# Patient Record
Sex: MALE | Race: WHITE | URBAN - METROPOLITAN AREA
[De-identification: names, ages, dates, MRNs, and addresses within clinical notes are randomized per-mention and may not be internally consistent; named-entity substitution may affect disease eponyms.]

---

## 2022-12-01 ENCOUNTER — OFFICE VISIT (OUTPATIENT)
Dept: FAMILY MEDICINE CLINIC | Facility: CLINIC | Age: 32
End: 2022-12-01
Payer: COMMERCIAL

## 2022-12-01 VITALS
HEIGHT: 69 IN | DIASTOLIC BLOOD PRESSURE: 68 MMHG | HEART RATE: 68 BPM | WEIGHT: 257 LBS | BODY MASS INDEX: 38.06 KG/M2 | SYSTOLIC BLOOD PRESSURE: 104 MMHG | RESPIRATION RATE: 18 BRPM | TEMPERATURE: 99 F | OXYGEN SATURATION: 100 %

## 2022-12-01 DIAGNOSIS — Z00.00 ROUTINE GENERAL MEDICAL EXAMINATION AT A HEALTH CARE FACILITY: ICD-10-CM

## 2022-12-01 DIAGNOSIS — E66.09 CLASS 2 OBESITY DUE TO EXCESS CALORIES WITHOUT SERIOUS COMORBIDITY WITH BODY MASS INDEX (BMI) OF 37.0 TO 37.9 IN ADULT: ICD-10-CM

## 2022-12-01 DIAGNOSIS — Z11.3 SCREENING FOR STD (SEXUALLY TRANSMITTED DISEASE): ICD-10-CM

## 2022-12-01 DIAGNOSIS — Z23 NEED FOR IMMUNIZATION AGAINST INFLUENZA: ICD-10-CM

## 2022-12-01 DIAGNOSIS — Z00.00 WELLNESS EXAMINATION: Primary | ICD-10-CM

## 2022-12-01 DIAGNOSIS — S39.012A STRAIN OF LUMBAR REGION, INITIAL ENCOUNTER: ICD-10-CM

## 2022-12-01 PROCEDURE — 90471 IMMUNIZATION ADMIN: CPT | Performed by: FAMILY MEDICINE

## 2022-12-01 PROCEDURE — 99385 PREV VISIT NEW AGE 18-39: CPT | Performed by: FAMILY MEDICINE

## 2022-12-01 PROCEDURE — 3074F SYST BP LT 130 MM HG: CPT | Performed by: FAMILY MEDICINE

## 2022-12-01 PROCEDURE — 99203 OFFICE O/P NEW LOW 30 MIN: CPT | Performed by: FAMILY MEDICINE

## 2022-12-01 PROCEDURE — 90686 IIV4 VACC NO PRSV 0.5 ML IM: CPT | Performed by: FAMILY MEDICINE

## 2022-12-01 PROCEDURE — 3008F BODY MASS INDEX DOCD: CPT | Performed by: FAMILY MEDICINE

## 2022-12-01 PROCEDURE — 3078F DIAST BP <80 MM HG: CPT | Performed by: FAMILY MEDICINE

## 2022-12-01 RX ORDER — CYCLOBENZAPRINE HCL 10 MG
10 TABLET ORAL NIGHTLY PRN
Qty: 10 TABLET | Refills: 0 | Status: SHIPPED | OUTPATIENT
Start: 2022-12-01 | End: 2022-12-21

## 2022-12-01 RX ORDER — MELOXICAM 7.5 MG/1
7.5 TABLET ORAL DAILY
Qty: 30 TABLET | Refills: 0 | Status: SHIPPED | OUTPATIENT
Start: 2022-12-01

## 2022-12-01 NOTE — PATIENT INSTRUCTIONS
Go for your fasting blood tests. Do not eat or drink except for water for at least 8 hours prior to the blood tests. Schedule your appointment with the weight loss clinic. Recommendations for exercise are 3-5 times weekly for 30-60 minutes for a minimum of 150-300 minutes. For premenopausal women and men, 1000 mg of calcium daily is recommended. For postmenopausal women, 1200 mg of calcium daily is recommended. To help the body absorb and use calcium, vitamin D 2000 international units daily is recommended. You received the Flu vaccine today. You may run a low grade fever, have mild redness or swelling at the site of the shot, muscle pain at the site of the shot for the next 2-3 days. You may take Tylenol or Ibuprofen as needed. Use your arm to help decrease pain and swelling. You can apply ice to any swelling for 10-15 minutes twice daily through clothing or a towel. Consider getting the new COVID booster. I will contact you with your test results once available.

## 2023-01-17 ENCOUNTER — OFFICE VISIT (OUTPATIENT)
Dept: FAMILY MEDICINE CLINIC | Facility: CLINIC | Age: 33
End: 2023-01-17
Payer: COMMERCIAL

## 2023-01-17 VITALS
DIASTOLIC BLOOD PRESSURE: 68 MMHG | BODY MASS INDEX: 38.06 KG/M2 | HEART RATE: 75 BPM | HEIGHT: 69 IN | RESPIRATION RATE: 18 BRPM | SYSTOLIC BLOOD PRESSURE: 100 MMHG | OXYGEN SATURATION: 99 % | WEIGHT: 257 LBS | TEMPERATURE: 98 F

## 2023-01-17 DIAGNOSIS — H92.01 ACUTE OTALGIA, RIGHT: Primary | ICD-10-CM

## 2023-01-17 DIAGNOSIS — H69.81 ACUTE DYSFUNCTION OF RIGHT EUSTACHIAN TUBE: ICD-10-CM

## 2023-01-17 PROCEDURE — 3078F DIAST BP <80 MM HG: CPT | Performed by: FAMILY MEDICINE

## 2023-01-17 PROCEDURE — 3074F SYST BP LT 130 MM HG: CPT | Performed by: FAMILY MEDICINE

## 2023-01-17 PROCEDURE — 99213 OFFICE O/P EST LOW 20 MIN: CPT | Performed by: FAMILY MEDICINE

## 2023-01-17 PROCEDURE — 3008F BODY MASS INDEX DOCD: CPT | Performed by: FAMILY MEDICINE

## 2023-01-17 RX ORDER — FLUTICASONE PROPIONATE 50 MCG
SPRAY, SUSPENSION (ML) NASAL
Qty: 1 EACH | Refills: 0 | Status: SHIPPED | OUTPATIENT
Start: 2023-01-17

## 2023-01-17 NOTE — PATIENT INSTRUCTIONS
Go to the lab today for your blood tests since you are fasting. Use the Flonase 2 sprays each nostril once daily after shower for at least the next 2 weeks or as long as you are congested. Use Tylenol or Ibuprofen as needed for pain. Follow up with me if you have any new or worsening symptoms.

## 2023-01-25 ENCOUNTER — LAB ENCOUNTER (OUTPATIENT)
Dept: LAB | Age: 33
End: 2023-01-25
Attending: FAMILY MEDICINE
Payer: COMMERCIAL

## 2023-01-25 DIAGNOSIS — Z00.00 ROUTINE GENERAL MEDICAL EXAMINATION AT A HEALTH CARE FACILITY: ICD-10-CM

## 2023-01-25 DIAGNOSIS — Z11.3 SCREENING FOR STD (SEXUALLY TRANSMITTED DISEASE): ICD-10-CM

## 2023-01-25 LAB
ALBUMIN SERPL-MCNC: 4 G/DL (ref 3.4–5)
ALBUMIN/GLOB SERPL: 1.1 {RATIO} (ref 1–2)
ALP LIVER SERPL-CCNC: 48 U/L
ALT SERPL-CCNC: 63 U/L
ANION GAP SERPL CALC-SCNC: 4 MMOL/L (ref 0–18)
AST SERPL-CCNC: 55 U/L (ref 15–37)
BASOPHILS # BLD AUTO: 0.04 X10(3) UL (ref 0–0.2)
BASOPHILS NFR BLD AUTO: 0.6 %
BILIRUB SERPL-MCNC: 1.6 MG/DL (ref 0.1–2)
BUN BLD-MCNC: 15 MG/DL (ref 7–18)
CALCIUM BLD-MCNC: 9.6 MG/DL (ref 8.5–10.1)
CHLORIDE SERPL-SCNC: 106 MMOL/L (ref 98–112)
CHOLEST SERPL-MCNC: 144 MG/DL (ref ?–200)
CO2 SERPL-SCNC: 29 MMOL/L (ref 21–32)
CREAT BLD-MCNC: 0.89 MG/DL
EOSINOPHIL # BLD AUTO: 0.06 X10(3) UL (ref 0–0.7)
EOSINOPHIL NFR BLD AUTO: 1 %
ERYTHROCYTE [DISTWIDTH] IN BLOOD BY AUTOMATED COUNT: 12.4 %
FASTING PATIENT LIPID ANSWER: YES
FASTING STATUS PATIENT QL REPORTED: YES
GFR SERPLBLD BASED ON 1.73 SQ M-ARVRAT: 118 ML/MIN/1.73M2 (ref 60–?)
GLOBULIN PLAS-MCNC: 3.7 G/DL (ref 2.8–4.4)
GLUCOSE BLD-MCNC: 97 MG/DL (ref 70–99)
HAV IGM SER QL: NONREACTIVE
HBV CORE IGM SER QL: NONREACTIVE
HBV SURFACE AG SERPL QL IA: NONREACTIVE
HCT VFR BLD AUTO: 46.9 %
HCV AB SERPL QL IA: NONREACTIVE
HDLC SERPL-MCNC: 38 MG/DL (ref 40–59)
HGB BLD-MCNC: 16.2 G/DL
IMM GRANULOCYTES # BLD AUTO: 0.06 X10(3) UL (ref 0–1)
IMM GRANULOCYTES NFR BLD: 1 %
LDLC SERPL CALC-MCNC: 72 MG/DL (ref ?–100)
LYMPHOCYTES # BLD AUTO: 2.42 X10(3) UL (ref 1–4)
LYMPHOCYTES NFR BLD AUTO: 39.1 %
MCH RBC QN AUTO: 33.2 PG (ref 26–34)
MCHC RBC AUTO-ENTMCNC: 34.5 G/DL (ref 31–37)
MCV RBC AUTO: 96.1 FL
MONOCYTES # BLD AUTO: 0.51 X10(3) UL (ref 0.1–1)
MONOCYTES NFR BLD AUTO: 8.2 %
NEUTROPHILS # BLD AUTO: 3.1 X10 (3) UL (ref 1.5–7.7)
NEUTROPHILS # BLD AUTO: 3.1 X10(3) UL (ref 1.5–7.7)
NEUTROPHILS NFR BLD AUTO: 50.1 %
NONHDLC SERPL-MCNC: 106 MG/DL (ref ?–130)
OSMOLALITY SERPL CALC.SUM OF ELEC: 289 MOSM/KG (ref 275–295)
PLATELET # BLD AUTO: 230 10(3)UL (ref 150–450)
POTASSIUM SERPL-SCNC: 4.4 MMOL/L (ref 3.5–5.1)
PROT SERPL-MCNC: 7.7 G/DL (ref 6.4–8.2)
RBC # BLD AUTO: 4.88 X10(6)UL
SODIUM SERPL-SCNC: 139 MMOL/L (ref 136–145)
T PALLIDUM AB SER QL IA: NONREACTIVE
TRIGL SERPL-MCNC: 202 MG/DL (ref 30–149)
TSI SER-ACNC: 1.56 MIU/ML (ref 0.36–3.74)
VLDLC SERPL CALC-MCNC: 31 MG/DL (ref 0–30)
WBC # BLD AUTO: 6.2 X10(3) UL (ref 4–11)

## 2023-01-25 PROCEDURE — 87591 N.GONORRHOEAE DNA AMP PROB: CPT

## 2023-01-25 PROCEDURE — 80053 COMPREHEN METABOLIC PANEL: CPT

## 2023-01-25 PROCEDURE — 36415 COLL VENOUS BLD VENIPUNCTURE: CPT

## 2023-01-25 PROCEDURE — 85025 COMPLETE CBC W/AUTO DIFF WBC: CPT

## 2023-01-25 PROCEDURE — 87491 CHLMYD TRACH DNA AMP PROBE: CPT

## 2023-01-25 PROCEDURE — 86780 TREPONEMA PALLIDUM: CPT

## 2023-01-25 PROCEDURE — 84443 ASSAY THYROID STIM HORMONE: CPT

## 2023-01-25 PROCEDURE — 80061 LIPID PANEL: CPT

## 2023-01-25 PROCEDURE — 80074 ACUTE HEPATITIS PANEL: CPT

## 2023-01-25 PROCEDURE — 87389 HIV-1 AG W/HIV-1&-2 AB AG IA: CPT

## 2023-01-26 LAB
C TRACH DNA SPEC QL NAA+PROBE: NEGATIVE
N GONORRHOEA DNA SPEC QL NAA+PROBE: NEGATIVE

## 2023-01-27 DIAGNOSIS — R79.89 ELEVATED LFTS: Primary | ICD-10-CM

## 2023-01-27 PROBLEM — E78.1 HYPERTRIGLYCERIDEMIA: Status: ACTIVE | Noted: 2023-01-27

## 2023-02-22 DIAGNOSIS — H69.81 ACUTE DYSFUNCTION OF RIGHT EUSTACHIAN TUBE: ICD-10-CM

## 2023-02-22 RX ORDER — FLUTICASONE PROPIONATE 50 MCG
SPRAY, SUSPENSION (ML) NASAL
Qty: 16 ML | Refills: 0 | Status: SHIPPED | OUTPATIENT
Start: 2023-02-22

## 2024-03-04 ENCOUNTER — OFFICE VISIT (OUTPATIENT)
Dept: FAMILY MEDICINE CLINIC | Facility: CLINIC | Age: 34
End: 2024-03-04
Payer: COMMERCIAL

## 2024-03-04 VITALS
WEIGHT: 268 LBS | BODY MASS INDEX: 40.62 KG/M2 | TEMPERATURE: 98 F | HEIGHT: 68 IN | OXYGEN SATURATION: 96 % | HEART RATE: 76 BPM | SYSTOLIC BLOOD PRESSURE: 104 MMHG | DIASTOLIC BLOOD PRESSURE: 62 MMHG | RESPIRATION RATE: 18 BRPM

## 2024-03-04 DIAGNOSIS — Z23 NEED FOR TDAP VACCINATION: ICD-10-CM

## 2024-03-04 DIAGNOSIS — E78.1 HYPERTRIGLYCERIDEMIA: ICD-10-CM

## 2024-03-04 DIAGNOSIS — R79.89 ELEVATED LFTS: ICD-10-CM

## 2024-03-04 DIAGNOSIS — Z00.00 WELLNESS EXAMINATION: Primary | ICD-10-CM

## 2024-03-04 DIAGNOSIS — Z28.21 IMMUNIZATION DECLINED: ICD-10-CM

## 2024-03-04 DIAGNOSIS — Z00.00 LABORATORY EXAM ORDERED AS PART OF ROUTINE GENERAL MEDICAL EXAMINATION: ICD-10-CM

## 2024-03-04 DIAGNOSIS — Z13.1 SCREENING FOR DIABETES MELLITUS: ICD-10-CM

## 2024-03-04 DIAGNOSIS — E66.01 MORBID OBESITY WITH BODY MASS INDEX (BMI) OF 40.0 TO 44.9 IN ADULT (HCC): ICD-10-CM

## 2024-03-04 LAB
ALBUMIN SERPL-MCNC: 3.7 G/DL (ref 3.4–5)
ALBUMIN/GLOB SERPL: 0.9 {RATIO} (ref 1–2)
ALP LIVER SERPL-CCNC: 55 U/L
ALT SERPL-CCNC: 50 U/L
ANION GAP SERPL CALC-SCNC: 1 MMOL/L (ref 0–18)
AST SERPL-CCNC: 21 U/L (ref 15–37)
BASOPHILS # BLD AUTO: 0.05 X10(3) UL (ref 0–0.2)
BASOPHILS NFR BLD AUTO: 0.8 %
BILIRUB SERPL-MCNC: 1 MG/DL (ref 0.1–2)
BUN BLD-MCNC: 10 MG/DL (ref 9–23)
CALCIUM BLD-MCNC: 9.3 MG/DL (ref 8.5–10.1)
CHLORIDE SERPL-SCNC: 111 MMOL/L (ref 98–112)
CHOLEST SERPL-MCNC: 157 MG/DL (ref ?–200)
CO2 SERPL-SCNC: 27 MMOL/L (ref 21–32)
CREAT BLD-MCNC: 0.83 MG/DL
EGFRCR SERPLBLD CKD-EPI 2021: 120 ML/MIN/1.73M2 (ref 60–?)
EOSINOPHIL # BLD AUTO: 0.05 X10(3) UL (ref 0–0.7)
EOSINOPHIL NFR BLD AUTO: 0.8 %
ERYTHROCYTE [DISTWIDTH] IN BLOOD BY AUTOMATED COUNT: 12 %
EST. AVERAGE GLUCOSE BLD GHB EST-MCNC: 117 MG/DL (ref 68–126)
FASTING PATIENT LIPID ANSWER: YES
FASTING STATUS PATIENT QL REPORTED: YES
GLOBULIN PLAS-MCNC: 4 G/DL (ref 2.8–4.4)
GLUCOSE BLD-MCNC: 99 MG/DL (ref 70–99)
HBA1C MFR BLD: 5.7 % (ref ?–5.7)
HCT VFR BLD AUTO: 45.8 %
HDLC SERPL-MCNC: 38 MG/DL (ref 40–59)
HGB BLD-MCNC: 16.1 G/DL
IMM GRANULOCYTES # BLD AUTO: 0.06 X10(3) UL (ref 0–1)
IMM GRANULOCYTES NFR BLD: 1 %
LDLC SERPL CALC-MCNC: 94 MG/DL (ref ?–100)
LYMPHOCYTES # BLD AUTO: 2.3 X10(3) UL (ref 1–4)
LYMPHOCYTES NFR BLD AUTO: 38.2 %
MCH RBC QN AUTO: 32.7 PG (ref 26–34)
MCHC RBC AUTO-ENTMCNC: 35.2 G/DL (ref 31–37)
MCV RBC AUTO: 92.9 FL
MONOCYTES # BLD AUTO: 0.44 X10(3) UL (ref 0.1–1)
MONOCYTES NFR BLD AUTO: 7.3 %
NEUTROPHILS # BLD AUTO: 3.12 X10 (3) UL (ref 1.5–7.7)
NEUTROPHILS # BLD AUTO: 3.12 X10(3) UL (ref 1.5–7.7)
NEUTROPHILS NFR BLD AUTO: 51.9 %
NONHDLC SERPL-MCNC: 119 MG/DL (ref ?–130)
OSMOLALITY SERPL CALC.SUM OF ELEC: 287 MOSM/KG (ref 275–295)
PLATELET # BLD AUTO: 210 10(3)UL (ref 150–450)
POTASSIUM SERPL-SCNC: 4.3 MMOL/L (ref 3.5–5.1)
PROT SERPL-MCNC: 7.7 G/DL (ref 6.4–8.2)
RBC # BLD AUTO: 4.93 X10(6)UL
SODIUM SERPL-SCNC: 139 MMOL/L (ref 136–145)
TRIGL SERPL-MCNC: 140 MG/DL (ref 30–149)
TSI SER-ACNC: 1.77 MIU/ML (ref 0.36–3.74)
VLDLC SERPL CALC-MCNC: 23 MG/DL (ref 0–30)
WBC # BLD AUTO: 6 X10(3) UL (ref 4–11)

## 2024-03-04 PROCEDURE — 80053 COMPREHEN METABOLIC PANEL: CPT | Performed by: FAMILY MEDICINE

## 2024-03-04 PROCEDURE — 85025 COMPLETE CBC W/AUTO DIFF WBC: CPT | Performed by: FAMILY MEDICINE

## 2024-03-04 PROCEDURE — 83036 HEMOGLOBIN GLYCOSYLATED A1C: CPT | Performed by: FAMILY MEDICINE

## 2024-03-04 PROCEDURE — 80061 LIPID PANEL: CPT | Performed by: FAMILY MEDICINE

## 2024-03-04 PROCEDURE — 84443 ASSAY THYROID STIM HORMONE: CPT | Performed by: FAMILY MEDICINE

## 2024-03-04 NOTE — PATIENT INSTRUCTIONS
Recommendations for exercise are 3-5 times weekly for 30-60 minutes for a minimum of 150-300 minutes.     For premenopausal women and men, 1000 mg of calcium daily is recommended. For postmenopausal women, 1200 mg of calcium daily is recommended.    To help the body absorb and use calcium, vitamin D 2000 international units daily is recommended.    Treatment for mild to moderate abnormal cholesterol and lipids is best managed initially with lifestyle changes, improving diet and increasing physical activity. Recommendations for exercise are 3-5 times weekly for 30-60 minutes for a minimum of 150-300 minutes. This will improve your cholesterol levels and strengthen your heart. If you are overweight, then losing weight may also improve your lipid profile.    The Mediterranean diet is recommended and contains foods high in omega-3's and alpha linoleic acid; this helps improve your good cholesterol and may lower bad cholesterol. Eat salmon 2x weekly, consume moderate amounts of vegetables, lean meats/fish, nuts and healthy fats like almonds, walnuts, flaxseed, hempseed, avocados, olive oil, spinach, green beans, organic strawberries, etc. Please avoid fried food or limit to 1x monthly along with pizza and other foods that are high in saturated animal fats.    Monitoring your cholesterol and lipid profile is recommended annually or sooner as directed by your doctor.     You received the Tdap (tetanus, diptheria, pertussis-whooping cough) vaccine today. You may run a low grade fever, have mild redness or swelling at the site of the shot, muscle pain at the site of the shot for the next 2-3 days. You may take Tylenol or Ibuprofen as needed. Use your arm to help decrease pain and swelling. You can apply ice to any swelling for 10-15 minutes twice daily through clothing or a towel.    I will contact you with your test results once available.

## 2024-03-04 NOTE — PROGRESS NOTES
The 21st Century Cures Act makes medical notes like these available to patients in the interest of transparency. Please be advised this is a medical document. Medical documents are intended to carry relevant information, facts as evident, and the clinical opinion of the practitioner. The medical note is intended as peer to peer communication and may appear blunt or direct. It is written in medical language and may contain abbreviations or verbiage that are unfamiliar.     Wyatt Tracey is a 33 year old male who is here for   Chief Complaint   Patient presents with    Well Adult       HPI:     This 33-year-old male presents to the office for his annual wellness exam and for follow-up on his laboratory done in January 2023.    The patient was noted to have elevation of his liver function tests as well as his triglycerides.  He had normal hepatitis panel.  The patient denies drinking any alcohol.  He is not currently on any medications either over-the-counter or prescription. He has no history of hepatitis or jaundice.  He denies any abdominal pain, nausea, vomiting, diarrhea, melena or hematochezia.    The patient states he is feeling well today.  He denies any associated fever, chills, cough, sore throat, runny nose, ear pain, chest pain, palpitations, shortness of breath, dizziness, syncope, leg swelling, history for anemia or thyroid problems.    The patient declines his flu shot today.  He agrees to get his Tdap booster.  He has not had the most recent COVID booster and declines it.      Exercise: none.  Diet: doesn't watch  Sleep: 5-8 hours     Depression/Anxiety:  PHQ-2 SCORE: 0  , done 3/4/2024   Last Plain Suicide Screening on 3/4/2024 was No Risk.       Fall Risk: No falls last 3 months  Gun in home:No          Glasses/contacts:No             Recent eye doctor visit:No   Hearing aids:No    Family History for:  Testicular CA - No   Prostate CA - No                               Colon CA -  No    Colonoscopy: Never    Past Medical History:   Diagnosis Date    Hypertriglyceridemia 01/27/2023    Obesity     Sleep apnea        History reviewed. No pertinent surgical history.    Family History   Problem Relation Age of Onset    No Known Problems Mother     Other (Other) Father         trigeminal neuralgia       Social History     Socioeconomic History    Marital status: Single   Tobacco Use    Smoking status: Never    Smokeless tobacco: Never   Vaping Use    Vaping Use: Never used   Substance and Sexual Activity    Alcohol use: Not Currently    Drug use: Not Currently    Sexual activity: Yes   Other Topics Concern    Caffeine Concern No    Exercise No    Seat Belt No    Special Diet No    Stress Concern Yes    Weight Concern Yes     Works as a QA assistant.    No current outpatient medications on file prior to visit.     No current facility-administered medications on file prior to visit.       No Known Allergies    REVIEW OF SYSTEMS:     See HPI for relevant ROS  GENERAL HEALTH: no other complaints  NEURO: no other complaints  VISION: no other complaints  RESPIRATORY: no other complaints  CARDIOVASCULAR: no other complaints  GI: no other complaints  : no other complaints  SKIN: no other complaints  PSYCH: no other complaints  EXT: no other complaints    EXAM:   /62 (BP Location: Left arm, Patient Position: Sitting, Cuff Size: large)   Pulse 76   Temp 98.1 °F (36.7 °C)   Resp 18   Ht 5' 8\" (1.727 m)   Wt 268 lb (121.6 kg)   SpO2 96%   BMI 40.75 kg/m²     Wt Readings from Last 3 Encounters:   03/04/24 268 lb (121.6 kg)   01/17/23 257 lb (116.6 kg)   12/01/22 257 lb (116.6 kg)     BP Readings from Last 3 Encounters:   03/04/24 104/62   01/17/23 100/68   12/01/22 104/68        Visual Acuity  Right Eye Visual Acuity: Uncorrected Right Eye Chart Acuity: 20/20   Left Eye Visual Acuity: Uncorrected Left Eye Chart Acuity: 20/20   Both Eyes Visual Acuity: Uncorrected Both Eyes Chart Acuity: 20/20    Able To Tolerate Visual Acuity: Yes      Weight is up 11 # from 01/17/2023 OV    General: WH/morbidly obese/WD, in NAD, A and O times 3  HEAD: Normocephalic, atraumatic  EYES: RUMA, EOMI, conjunctiva normal, sclera anicteric  EARS: Tympanic membranes normal, EAC's normal.  NOSE: Turbninates normal, no bleeding noted.  PHARYNX:  No eythema or exudates, mucous membrane moist, airway patent.  NECK:  No cervical lymphadenopathy or thyromegaly, normal ROM, no bruits noted.  HEART: Regular rate and rhythm, no S3, S4 or murmur noted.  LUNGS: Clear to ausculation. No retractions or tachypnea noted.  ABDOMEN: Soft, obese,nontender, no guarding, rigidity or rebound, no masses or hepatosplenomegaly, normal bowel sounds in all four quadrants.  : deferred  BACK: No tenderness over the thoracic or lumbar spine. No scoliosis noted.  EXTREMITIES: No clubbing, cyanosis, edema noted. Motor strength +5/5 in all 4 extremities. DTR's +2/4 in all 4 extremities.  SKIN: Warm and dry.  NEURO: Cr. N. II-XII intact, normal gait  PSYCH: Mood and affect are normal.    ASSESSMENT AND PLAN:   1. Wellness examination  -We discussed the following:  Healthy diet and exercise, immunizations, cancer screening.    2. Laboratory exam ordered as part of routine general medical examination    - CBC With Differential With Platelet; Future  - Lipid Panel; Future  - TSH W Reflex To Free T4; Future    3. Screening for diabetes mellitus    - Hemoglobin A1C [E]; Future    4. Elevated LFTs    Previous AST and ALT were 55 and 63. Will recheck CMP. May be related to fatty liver.    - Comp Metabolic Panel (14) [E]; Future    5. Hypertriglyceridemia    Cholesterol:     Lab Results   Component Value Date    CHOLEST 144 01/25/2023     Lab Results   Component Value Date    HDL 38 (L) 01/25/2023     Lab Results   Component Value Date    TRIG 202 (H) 01/25/2023     Lab Results   Component Value Date    LDL 72 01/25/2023     Lab Results   Component Value Date    AST  55 (H) 01/25/2023     Lab Results   Component Value Date    ALT 63 (H) 01/25/2023     Due for repeat lipid panel.    6. Morbid obesity with body mass index (BMI) of 40.0 to 44.9 in adult (HCC)    Encouraged patient to monitor his diet and increase exercise.    7. Need for Tdap vaccination    Tdap given today. Side effects reviewed.    - TdaP (Adacel, Boostrix) [35289]    8. Immunization declined    Patient declined flu shot and COVID booster.    - Influenza Refused         Health maintenance:    Health Maintenance   Topic Date Due    DTaP,Tdap,and Td Vaccines (7 - Td or Tdap) 03/04/2034    COVID-19 Vaccine (3 - 2023-24 season) 03/04/2025    Influenza Vaccine (1) 06/30/2024    Annual Physical  03/04/2025    Annual Depression Screening  Completed    Pneumococcal Vaccine: Birth to 64yrs  Aged Out         Orders This Visit:  Orders Placed This Encounter   Procedures    Comp Metabolic Panel (14) [E]    CBC With Differential With Platelet    Lipid Panel    TSH W Reflex To Free T4    Hemoglobin A1C [E]    TdaP (Adacel, Boostrix) [28796]    Influenza Refused       Meds This Visit:  Requested Prescriptions      No prescriptions requested or ordered in this encounter       Imaging & Referrals:  TETANUS, DIPHTHERIA TOXOIDS AND ACELLULAR PERTUSIS VACCINE (TDAP), >7 YEARS, IM USE  INFLUENZA REFUSED [facility]     The patient indicates understanding of these issues and agrees to the plan.  The patient is asked to return pending lab results.  Maribel Padilla,     I spent a total of 40 minutes including precharting, H&P, plan of care    This dictation was performed with a verbal recognition program (DRAGON) and it was checked for errors. It is possible that there are still dictated errors within this office note. If so, please bring any errors to my attention for an addendum. All efforts were made to ensure that this office note is accurate

## 2024-03-05 PROBLEM — R73.03 PREDIABETES: Status: ACTIVE | Noted: 2024-03-05

## 2024-07-23 ENCOUNTER — TELEPHONE (OUTPATIENT)
Dept: FAMILY MEDICINE CLINIC | Facility: CLINIC | Age: 34
End: 2024-07-23

## 2024-07-23 NOTE — TELEPHONE ENCOUNTER
Patient scheduled an appointment thru My-Chart for 8/16/24 and stated in the appointment notes High migraine, throwing up gaging reflex, sore throat. Immense stress. . Please advise if patient is OK to wait for appointment or needs to be seen sooner.     Future Appointments   Date Time Provider Department Center   8/16/2024 12:00 PM Maribel Padilla DO [facility] [place]

## 2024-07-26 NOTE — TELEPHONE ENCOUNTER
Spoke w/patient. Moved appointment up to Monday in a cancellation spot. Patient is currently doing a bit better explaining that he did try an urgent care and got some relief and the symptoms are not constant so he is managing. He will come in Monday. Patient/caregiver verbalized agreement and understanding.

## 2024-07-29 ENCOUNTER — OFFICE VISIT (OUTPATIENT)
Dept: FAMILY MEDICINE CLINIC | Facility: CLINIC | Age: 34
End: 2024-07-29
Payer: COMMERCIAL

## 2024-07-29 VITALS
HEART RATE: 88 BPM | RESPIRATION RATE: 18 BRPM | OXYGEN SATURATION: 97 % | WEIGHT: 277 LBS | TEMPERATURE: 100 F | SYSTOLIC BLOOD PRESSURE: 120 MMHG | DIASTOLIC BLOOD PRESSURE: 80 MMHG | BODY MASS INDEX: 41.98 KG/M2 | HEIGHT: 68 IN

## 2024-07-29 DIAGNOSIS — G43.009 MIGRAINE WITHOUT AURA AND WITHOUT STATUS MIGRAINOSUS, NOT INTRACTABLE: Primary | ICD-10-CM

## 2024-07-29 DIAGNOSIS — R11.0 NAUSEA: ICD-10-CM

## 2024-07-29 DIAGNOSIS — J30.2 SEASONAL ALLERGIC RHINITIS, UNSPECIFIED TRIGGER: ICD-10-CM

## 2024-07-29 DIAGNOSIS — L73.9 FOLLICULITIS: ICD-10-CM

## 2024-07-29 PROCEDURE — 3074F SYST BP LT 130 MM HG: CPT | Performed by: FAMILY MEDICINE

## 2024-07-29 PROCEDURE — 3079F DIAST BP 80-89 MM HG: CPT | Performed by: FAMILY MEDICINE

## 2024-07-29 PROCEDURE — 3008F BODY MASS INDEX DOCD: CPT | Performed by: FAMILY MEDICINE

## 2024-07-29 PROCEDURE — G2211 COMPLEX E/M VISIT ADD ON: HCPCS | Performed by: FAMILY MEDICINE

## 2024-07-29 PROCEDURE — 99214 OFFICE O/P EST MOD 30 MIN: CPT | Performed by: FAMILY MEDICINE

## 2024-07-29 RX ORDER — SULFAMETHOXAZOLE AND TRIMETHOPRIM 800; 160 MG/1; MG/1
1 TABLET ORAL 2 TIMES DAILY
Qty: 14 TABLET | Refills: 0 | Status: SHIPPED | OUTPATIENT
Start: 2024-07-29

## 2024-07-29 RX ORDER — FLUTICASONE PROPIONATE 50 MCG
SPRAY, SUSPENSION (ML) NASAL
Qty: 1 EACH | Refills: 2 | Status: SHIPPED | OUTPATIENT
Start: 2024-07-29

## 2024-07-29 RX ORDER — SUMATRIPTAN 100 MG/1
TABLET, FILM COATED ORAL
Qty: 9 TABLET | Refills: 1 | Status: SHIPPED | OUTPATIENT
Start: 2024-07-29

## 2024-07-29 RX ORDER — ONDANSETRON 4 MG/1
4 TABLET, FILM COATED ORAL EVERY 8 HOURS PRN
Qty: 20 TABLET | Refills: 2 | Status: SHIPPED | OUTPATIENT
Start: 2024-07-29

## 2024-07-29 NOTE — PATIENT INSTRUCTIONS
Take the Bactrim DS one tablet with breakfast and dinner for the folliculitis at the back of your head. Bactrim can cause sun sensitivity and easy sunburn. Use at least a 30 SPF sunscreen if you are in the sun while on this medication.     Do not shave the back of your head for the next 4 weeks to let this heal.    Continue head and shoulders shampoo at least 2 times a week.    Use the Flonase 2 sprays each nostril once daily after shower for at least the next 4 weeks or as long as you are congested.    Take Zyrtec 10 mg one tablet at bedtime for the nasal congestion.    Take the Zofran 4 mg ODT one tablet every 6 hours as needed for nausea or vomiting.    Take Excedrin Migraine at the onset of your headache. If you do not get any relief in one hour, then take the Imitrex.    Take the Imitrex at onset of migraine. May repeat one tab in 2 hours if not better. No more than 2 tabs per 24 hours. Do not use more than 2x/week    Keep a headache diary. List the level of pain 1-10, what medication you took to relieve the pain and any possible triggers.    Let me know if you headaches do not improve.

## 2024-07-29 NOTE — PROGRESS NOTES
The 21st Century Cures Act makes medical notes like these available to patients in the interest of transparency. Please be advised this is a medical document. Medical documents are intended to carry relevant information, facts as evident, and the clinical opinion of the practitioner. The medical note is intended as peer to peer communication and may appear blunt or direct. It is written in medical language and may contain abbreviations or verbiage that are unfamiliar.       Wyatt Tracey is a 34 year old male.    HPI:     Chief Complaint   Patient presents with    Migraine     C/o migraines x 1 month patient states he had 2 episodes last week lasting 4 hours        This 34-year-old male presents to the office with complaint of increased frequency of headaches over the last month.  The patient states he has had low-grade headache on a daily basis but last week, he had 2 episodes on Monday and Tuesday which were severe enough to send him home from work.  He states the headaches have been located on the top of his head and have been associated with nausea but no vomiting, blurred vision but no slurred speech, numbness or weakness in the arms or legs.  He states the last time he had headaches this severe was in 2016.  He has only taken 1 dose of ibuprofen 200 mg in the 4 weeks for his headaches.  There is no family history for migraines.  He has noted increasing sinus congestion and nasal stuffiness and tested himself for COVID last week which was negative.  He denies any known seasonal allergies.  He is not currently using any antihistamines or nasal sprays.  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  He has dramatic temperature changes several times a day.    The patient also is complaining of some itchy sore bumps in the back of his head for the last week.  He states he had his head shaved a week ago.  He does shampoo with head and  shoulders a couple times a week.  He has not put anything on the bumps.    HISTORY:  Past Medical History:    Hypertriglyceridemia    Obesity    Sleep apnea      No past surgical history on file.   Family History   Problem Relation Age of Onset    No Known Problems Mother     Other (Other) Father         trigeminal neuralgia      Social History:   Social History     Socioeconomic History    Marital status: Single   Tobacco Use    Smoking status: Never    Smokeless tobacco: Never   Vaping Use    Vaping status: Never Used   Substance and Sexual Activity    Alcohol use: Not Currently    Drug use: Not Currently    Sexual activity: Yes   Other Topics Concern    Caffeine Concern No    Exercise No    Seat Belt No    Special Diet No    Stress Concern Yes    Weight Concern Yes        Medications (Active prior to today's visit):  Current Outpatient Medications   Medication Sig Dispense Refill    sulfamethoxazole-trimethoprim -160 MG Oral Tab per tablet Take 1 tablet by mouth 2 (two) times daily. 14 tablet 0    SUMAtriptan Succinate (IMITREX) 100 MG Oral Tab Take at onset of migraine. May repeat one tab in 2 hours if not better. No more than 2 tabs per 24 hours. Do not use more than 2x/week. 9 tablet 1    ondansetron (ZOFRAN) 4 mg tablet Take 1 tablet (4 mg total) by mouth every 8 (eight) hours as needed for Nausea. 20 tablet 2    fluticasone propionate 50 MCG/ACT Nasal Suspension 2 sprays to each nostril once daily after shower. 1 each 2       Allergies:  No Known Allergies    ROS:     Pertinent positives and pertinent negatives are as listed in HPI.    All other review of symptoms were reviewed and negative.    PHYSICAL EXAM:   /80   Pulse 88   Temp 99.7 °F (37.6 °C)   Resp 18   Ht 5' 8\" (1.727 m)   Wt 277 lb (125.6 kg)   SpO2 97%   BMI 42.12 kg/m²     Wt Readings from Last 3 Encounters:   07/29/24 277 lb (125.6 kg)   03/04/24 268 lb (121.6 kg)   01/17/23 257 lb (116.6 kg)       BP Readings from Last 3  Encounters:   07/29/24 120/80   03/04/24 104/62   01/17/23 100/68       General: Morbidly obese, well hydrated. No acute distress. No pallor.   HEENT: Normocephalic, atraumatic.  RUMA, EOMI, Sclera clear and non icteric bilaterally. TM's normal, nose with marked congestion, pharynx without redness or exudates.  Postnasal drip is noted.  Moist mucous membranes.  Neck: Supple. No lymphadenopathy. No thyromegaly. No bruits noted.  Heart: RRR without S3 or S4 or murmur.  Lungs: Clear to auscultation bilaterally. No rales, rhonchi or wheezes. No tachypnea or retractions noted.  Abdomen: Soft, nontender, nondistended, normal bowel sounds. No organomegaly. No guarding, rigidity or rebound noted.  Extremities: No edema bilaterally.  Skin: Warm and dry. Folliculitis noted posterior scalp along the occiput. No purulent drainage noted.  Neuro: Alert and oriented x 3.Cr. N. II-XII intact, normal gait.  Psych: Normal mood and affect.     ASSESSMENT/PLAN:   34 year old male with      1. Migraine without aura and without status migrainosus, not intractable    I discussed migraine headaches with the patient.  We discussed possible triggers for migraines.  I am asking he keep a headache diary.  He may start with Excedrin Migraine at the onset of his headache.  If no relief in 1 hour, then he should take the Imitrex.  He was reminded not to take more than 2 in 24 hours and no more often than twice a week.  He will let me know if his headaches or not improving.    - SUMAtriptan Succinate (IMITREX) 100 MG Oral Tab; Take at onset of migraine. May repeat one tab in 2 hours if not better. No more than 2 tabs per 24 hours. Do not use more than 2x/week.  Dispense: 9 tablet; Refill: 1    2. Seasonal allergic rhinitis, unspecified trigger    I discussed how allergies can also be a trigger for migraines.  I would like him to take Zyrtec 10 mg at nighttime daily and a prescription for Flonase 2 sprays each nostril once daily after shower for  the next 4 weeks or as long as he is congested was sent to his pharmacy.    - fluticasone propionate 50 MCG/ACT Nasal Suspension; 2 sprays to each nostril once daily after shower.  Dispense: 1 each; Refill: 2    3. Folliculitis    The patient was noted to have multiple inflamed hair follicles along the occiput.  They are red and irritated.  There is no purulent drainage noted.  A prescription for Bactrim DS 1 tablet twice daily for 7 days was given.  Sun precautions were given.  The patient was advised not to get his hair shaved for at least the next 4 weeks to let this heal.    - sulfamethoxazole-trimethoprim -160 MG Oral Tab per tablet; Take 1 tablet by mouth 2 (two) times daily.  Dispense: 14 tablet; Refill: 0    4. Nausea    A prescription for Zofran 4 mg ODT 1 tablet every 6-8 hours as needed for nausea was given.    - ondansetron (ZOFRAN) 4 mg tablet; Take 1 tablet (4 mg total) by mouth every 8 (eight) hours as needed for Nausea.  Dispense: 20 tablet; Refill: 2         Health Maintenance:    Health Maintenance   Topic Date Due    COVID-19 Vaccine (3 - 2023-24 season) 03/04/2025 (Originally 9/1/2023)    Influenza Vaccine (1) 10/01/2024    Annual Physical  03/04/2025    DTaP,Tdap,and Td Vaccines (8 - Td or Tdap) 03/04/2034    Annual Depression Screening  Completed    Pneumococcal Vaccine: Birth to 64yrs  Aged Out         Meds This Visit:  Requested Prescriptions     Signed Prescriptions Disp Refills    sulfamethoxazole-trimethoprim -160 MG Oral Tab per tablet 14 tablet 0     Sig: Take 1 tablet by mouth 2 (two) times daily.    SUMAtriptan Succinate (IMITREX) 100 MG Oral Tab 9 tablet 1     Sig: Take at onset of migraine. May repeat one tab in 2 hours if not better. No more than 2 tabs per 24 hours. Do not use more than 2x/week.    ondansetron (ZOFRAN) 4 mg tablet 20 tablet 2     Sig: Take 1 tablet (4 mg total) by mouth every 8 (eight) hours as needed for Nausea.    fluticasone propionate 50 MCG/ACT  Nasal Suspension 1 each 2     Si sprays to each nostril once daily after shower.       Imaging & Referrals:  None     Patient understands plan and follow-up.  Follow up if not improvement in the headaches.    2024  Maribel Padilla DO    Total time: 30 minutes including precharting, H&P, plan of care    This dictation was performed with a verbal recognition program (DRAGON) and it was checked for errors. It is possible that there are still dictated errors within this office note. If so, please bring any errors to my attention for an addendum. All efforts were made to ensure that this office note is accurate

## 2024-10-12 ENCOUNTER — OFFICE VISIT (OUTPATIENT)
Dept: FAMILY MEDICINE CLINIC | Facility: CLINIC | Age: 34
End: 2024-10-12
Payer: COMMERCIAL

## 2024-10-12 VITALS
DIASTOLIC BLOOD PRESSURE: 70 MMHG | WEIGHT: 280 LBS | SYSTOLIC BLOOD PRESSURE: 104 MMHG | HEIGHT: 68 IN | BODY MASS INDEX: 42.44 KG/M2 | HEART RATE: 66 BPM | RESPIRATION RATE: 18 BRPM | TEMPERATURE: 98 F

## 2024-10-12 DIAGNOSIS — R68.84 JAW PAIN: Primary | ICD-10-CM

## 2024-10-12 DIAGNOSIS — M26.621 ARTHRALGIA OF RIGHT TEMPOROMANDIBULAR JOINT: ICD-10-CM

## 2024-10-12 DIAGNOSIS — Z28.21 IMMUNIZATION DECLINED: ICD-10-CM

## 2024-10-12 PROBLEM — G47.33 OSA (OBSTRUCTIVE SLEEP APNEA): Status: ACTIVE | Noted: 2024-10-12

## 2024-10-12 PROCEDURE — 3074F SYST BP LT 130 MM HG: CPT | Performed by: FAMILY MEDICINE

## 2024-10-12 PROCEDURE — G2211 COMPLEX E/M VISIT ADD ON: HCPCS | Performed by: FAMILY MEDICINE

## 2024-10-12 PROCEDURE — 99213 OFFICE O/P EST LOW 20 MIN: CPT | Performed by: FAMILY MEDICINE

## 2024-10-12 PROCEDURE — 3078F DIAST BP <80 MM HG: CPT | Performed by: FAMILY MEDICINE

## 2024-10-12 PROCEDURE — 3008F BODY MASS INDEX DOCD: CPT | Performed by: FAMILY MEDICINE

## 2024-10-12 RX ORDER — MELOXICAM 15 MG/1
15 TABLET ORAL DAILY
Qty: 15 TABLET | Refills: 0 | Status: SHIPPED | OUTPATIENT
Start: 2024-10-12

## 2024-10-12 NOTE — PROGRESS NOTES
The 21st Century Cures Act makes medical notes like these available to patients in the interest of transparency. Please be advised this is a medical document. Medical documents are intended to carry relevant information, facts as evident, and the clinical opinion of the practitioner. The medical note is intended as peer to peer communication and may appear blunt or direct. It is written in medical language and may contain abbreviations or verbiage that are unfamiliar.       Wyatt Tracey is a 34 year old male.    HPI:     Chief Complaint   Patient presents with    Jaw Pain     Right side - x7 days       This 34-year-old male presents to the office with complaint of right-sided jaw pain for the last 7 days.  The patient has pain and swelling anterior to the right ear.  He states he saw his dentist 2 days ago.  He had x-rays done which showed no evidence for infection.  He was told he had inflammation noted on the x-ray and was told to take some Tylenol.  Patient has not taken any Tylenol yet because he did not have any in the house.  He has no associated fever, chills, runny nose, sore throat, earache.  He had a little bit of a dry cough earlier in the week but it has since resolved.  He has pain with chewing.  He states he has been mostly eating soups the last 2 days.  He has no known history for grinding his teeth.  The symptoms are only on the right side.  He does not chew gum regularly.    HISTORY:  Past Medical History:    Hypertriglyceridemia    Obesity    Sleep apnea      History reviewed. No pertinent surgical history.   Family History   Problem Relation Age of Onset    No Known Problems Mother     Other (Other) Father         trigeminal neuralgia      Social History:   Social History     Socioeconomic History    Marital status: Single   Tobacco Use    Smoking status: Never    Smokeless tobacco: Never   Vaping Use    Vaping status: Never Used   Substance and Sexual Activity    Alcohol use: Not  Currently    Drug use: Not Currently    Sexual activity: Yes   Other Topics Concern    Caffeine Concern No    Exercise No    Seat Belt No    Special Diet No    Stress Concern Yes    Weight Concern Yes        Medications (Active prior to today's visit):  Current Outpatient Medications   Medication Sig Dispense Refill    Meloxicam 15 MG Oral Tab Take 1 tablet (15 mg total) by mouth daily. 15 tablet 0    SUMAtriptan Succinate (IMITREX) 100 MG Oral Tab Take at onset of migraine. May repeat one tab in 2 hours if not better. No more than 2 tabs per 24 hours. Do not use more than 2x/week. 9 tablet 1       Allergies:  Allergies[1]    ROS:     Pertinent positives and pertinent negatives are as listed in HPI.    All other review of symptoms were reviewed and negative.    PHYSICAL EXAM:   /70 (BP Location: Left arm, Patient Position: Sitting, Cuff Size: large)   Pulse 66   Temp 97.9 °F (36.6 °C)   Resp 18   Ht 5' 8\" (1.727 m)   Wt 280 lb (127 kg)   BMI 42.57 kg/m²     Wt Readings from Last 3 Encounters:   10/12/24 280 lb (127 kg)   07/29/24 277 lb (125.6 kg)   03/04/24 268 lb (121.6 kg)       BP Readings from Last 3 Encounters:   10/12/24 104/70   07/29/24 120/80   03/04/24 104/62     Weight is up 12 # from 03/04/2024 OV    General: Obese, well hydrated. No acute distress. No pallor.   HEENT: Normocephalic, atraumatic.  RUMA, EOMI, Sclera clear and non icteric bilaterally. TM's normal, nose without congestion, pharynx without redness or exudates. Moist mucous membranes.  He has tenderness noted over the right TMJ.  No palpable click is noted.  He has pain with motion of the jaw. No swelling of the parotid gland is noted.  Neck: Supple. No lymphadenopathy. No thyromegaly. No bruits noted.  Heart: RRR without S3 or S4 or murmur.  Lungs: Clear to auscultation bilaterally. No rales, rhonchi or wheezes. No tachypnea or retractions noted.  Extremities: No edema bilaterally.  Skin: Warm and dry.  Neuro: Alert and  oriented x 3,  normal gait.  Psych: Normal mood and affect.     ASSESSMENT/PLAN:   34 year old male with         1. Jaw pain  2. Arthralgia of right temporomandibular joint    I discussed TMJ syndrome with the patient.  Symptomatic treatment with ice is reviewed.  He should avoid chewing gum or trying to bite on anything hard or crunchy.  He should stay on a softer diet until the pain improves.  A prescription for meloxicam 15 mg 1 tablet daily with food was given.  Usage and side effects are reviewed.  The patient was advised if his symptoms are not improving over the next 2 weeks, he should speak with his dentist about whether or not he would benefit from a bite guard.    - Meloxicam 15 MG Oral Tab; Take 1 tablet (15 mg total) by mouth daily.  Dispense: 15 tablet; Refill: 0    3. Immunization declined    Patient declined flu shot today.  I encouraged him to get his flu shot and COVID booster at his local pharmacy.    - INFLUENZA REFUSED [facility]       Health Maintenance:    Health Maintenance   Topic Date Due    COVID-19 Vaccine (3 - 2023-24 season) 10/01/2025    Influenza Vaccine (1) 10/01/2025    Annual Physical  03/04/2025    DTaP,Tdap,and Td Vaccines (8 - Td or Tdap) 03/04/2034    Annual Depression Screening  Completed    Pneumococcal Vaccine: Birth to 64yrs  Aged Out         Meds This Visit:  Requested Prescriptions     Signed Prescriptions Disp Refills    Meloxicam 15 MG Oral Tab 15 tablet 0     Sig: Take 1 tablet (15 mg total) by mouth daily.       Imaging & Referrals:  INFLUENZA REFUSED [facility]     Patient understands plan and follow-up.  Follow up with his dentist if not improving.    10/12/2024  Maribel Padilla DO    Total time: 20 minutes including precharting, H&P, plan of care    This dictation was performed with a verbal recognition program (DRAGON) and it was checked for errors. It is possible that there are still dictated errors within this office note. If so, please bring any errors to my attention for  an addendum. All efforts were made to ensure that this office note is accurate           [1] No Known Allergies

## 2024-10-12 NOTE — PATIENT INSTRUCTIONS
Take the Meloxicam 15 mg one tablet daily with breakfast for the jaw pain.    Do not take any Ibuprofen, Advil, Motrin Aleve, Naproxen while on this medication.  Take your anti inflammatory medicine with food, stop and call if gastrointestinal (GI) side effects.  You are at risk of GI upset, stomach ulcer, gastrointestional bleeding while taking anti-inflammatory medications.   If you are having stomach pain or worsening heartburn, stop the medication and call a physician immediately.   If black stool or rectal bleeding, go to the nearest emergency room as-soon-as possible.  Tylenol is safe to take later in the day if needed.    Avoid biting on any thing hard or using chewing gum.     Always ice through a towel or clothing, never directly on bare skin to avoid frost bite. Do not ice longer than 10-15 minutes at a time but you can do it multiple times a day.    If not better in 2 weeks, as your dentist about a bite guard.    Consider getting your flu shot and the new COVID booster at your local pharmacy.    Keep your appointment as scheduled on 10/4/2024.

## 2024-11-04 ENCOUNTER — OFFICE VISIT (OUTPATIENT)
Dept: FAMILY MEDICINE CLINIC | Facility: CLINIC | Age: 34
End: 2024-11-04
Payer: COMMERCIAL

## 2024-11-04 VITALS
RESPIRATION RATE: 18 BRPM | HEART RATE: 70 BPM | TEMPERATURE: 98 F | OXYGEN SATURATION: 99 % | BODY MASS INDEX: 42.28 KG/M2 | HEIGHT: 68 IN | SYSTOLIC BLOOD PRESSURE: 98 MMHG | DIASTOLIC BLOOD PRESSURE: 58 MMHG | WEIGHT: 279 LBS

## 2024-11-04 DIAGNOSIS — E66.01 MORBID OBESITY WITH BODY MASS INDEX (BMI) OF 40.0 TO 44.9 IN ADULT (HCC): ICD-10-CM

## 2024-11-04 DIAGNOSIS — G47.33 OSA (OBSTRUCTIVE SLEEP APNEA): ICD-10-CM

## 2024-11-04 DIAGNOSIS — L73.9 FOLLICULITIS: Primary | ICD-10-CM

## 2024-11-04 PROCEDURE — 3074F SYST BP LT 130 MM HG: CPT | Performed by: FAMILY MEDICINE

## 2024-11-04 PROCEDURE — 99214 OFFICE O/P EST MOD 30 MIN: CPT | Performed by: FAMILY MEDICINE

## 2024-11-04 PROCEDURE — 3008F BODY MASS INDEX DOCD: CPT | Performed by: FAMILY MEDICINE

## 2024-11-04 PROCEDURE — 3078F DIAST BP <80 MM HG: CPT | Performed by: FAMILY MEDICINE

## 2024-11-04 PROCEDURE — G2211 COMPLEX E/M VISIT ADD ON: HCPCS | Performed by: FAMILY MEDICINE

## 2024-11-04 RX ORDER — SULFAMETHOXAZOLE AND TRIMETHOPRIM 800; 160 MG/1; MG/1
1 TABLET ORAL 2 TIMES DAILY
Qty: 14 TABLET | Refills: 0 | Status: SHIPPED | OUTPATIENT
Start: 2024-11-04

## 2024-11-04 RX ORDER — ECHINACEA PURPUREA EXTRACT 125 MG
1 TABLET ORAL AS NEEDED
COMMUNITY
Start: 2023-12-04 | End: 2024-12-03

## 2024-11-04 NOTE — PATIENT INSTRUCTIONS
Take the Bactrim DS one tablet with breakfast and dinner for one week. Bactrim can cause sun sensitivity and easy sunburn. Use at least a 30 SPF sunscreen if you are in the sun while on this medication.     Schedule your sleep study to assess for sleep apnea and possible need for CPAP machine. After the test has been completed, you will see Dr. Denise or one of her partners, the sleep specialist.    Make an appointment with the weight loss clinic. Check with your insurance company to see what medications they cover for weight loss.    Patient Resources:     Personal Training/Fitness Classes/Health Coaching     [facility] in [place]: Full fitness center with group fitness and personal training located in [place].  Health Coaching with Miracle Shepard, Kenyon Lo, and Edgar Trammell at our [facility]- individual coaching to work on your health goals. Call [phone] and/or email @ [email or web address]. Free 60 minute consult when client of [employer] [facility].  [address] [place] [address] http://www.[employer]. A variety of group fitness options plus various yoga classes [phone] and/or email Karlene at [email or web address]  [facility] with multiple locations: KnCMiner Fitness (www.Soysuper), F45 Training (www.b23mnzlwpkw.Wave [employer]band), Fit Body Bootcamp (www.AVOS SystemsbodybootWideOrbitp.Wave Broadband), InvestingNote (www.[employer].Wave Broadband), The Exercise  (www.exercisecoach.com), Club Pilates (www.clubpilates.com)     Online Fitness  Fitness  on Band Digital  Fit in 10 DVD series                              www.orpef48SWO.Wave Broadband  Chair exercises via Sit and Be Fit (www.sitandbefit.org) and Intelen (www.ticketstreet.com) or Jeramy Hadley or Rio Perez videos on YouTube.  Hip Hop Fit with Ruddy Delgadillo at www.hiphopfit.net     Apps for on the Go Fitness  [place] 7 Minute Workout (orange box with white 7) - free on the go HIIT training  marsha  Peloton Marsha @ www.onepeloton.com     Nutrition Trackers and Programs  LoseIT! And My Fitness Pal apps and on line for tracking nutrition  NOOM - virtual health coaching  FitFoundation (healthy meals on the go) in [place] @ www.eglcxqsvkcvil1kMarkerly  Julieta SAEED @ www.bistromd.[employer]. and Maxumu69 (calorie smart and low carb plans recommended) @ www.iroqyz07.com, Metabolic Meals @ www.MyMetabolicMeals.com - individual prepared meals to go  Gobble, Blue Apron, Home , Every Plate, Sunbasket- on line meal delivery programs for preparation at home  Meal Village in [place] for homemade meals to go @ www.mealGrand St.llage.[employer].  Diet Doctor @ www.dietdoctor.com - low carb swaps  Yummly - meal prep and planning marsha (Fourandhalf.yummly.com)     Stress, Anxiety, Depression, Trauma  CALM meditation marsha (www.calm.com)  Headspace  Don't let anxiety run your life. Using the science of emotion regulation and mindfulness to overcome fear and worry by Shaun Rodriguez PsyD and Brett Baker MA.  [employer] Podcast (September 27, 2023): 6 Magic Words That Stop Anxiety  What Happened to You?- a look at the impact trauma has on behavior written by Sary Emery and Dr. Prashanth Trevino  Whole Again by Arnaldo Gamino - discovering your true self after trauma     Mindful Eating/The Hungry Brain  Am I Hungry? Mindful eating virtual  marsha (www.amihungry.com)  The Hungry Brain by La Melara, PhD  Mindless Eating by Mark Kam  Weight Loss Surgery Will Not Treat Food Addiction by Preethi Hirsch Ph.D     Metabolic Dysfunction, Hormones and Cravings  Why We Get Sick? By Gary Mckenzie (insulin resistance)  Your Body in Balance: The New Science of Food, Hormones, and Health by Dr. Shane Painting  The Complete Guide to fasting by Dr. Munoz  Fast Like a Girl by Dr. Kell Browne  The Menopause Reset by Dr. Kell Browne  Sugar, Salt & Fat by Keyanna Solitario, Ph.D, R.D.  The Truth About Sugar - documentary on sugar (Free on Utube,  https://youtu.be/4N1rnvrGW1y)  Reverse Visceral Fat: #1 Way to Increase Your Lifespan & End Inflammation with Dr. Harrison Chaney on Utube @ https://youtu.be/nupPRnvUpJY?si=ri2kidTkNSP8PsuR     Nutrition Support  You Are What You Eat - Netfix series on twin study looking at impact of nutrition changes on health  The End of Dieting: How to Live for Life by Dr. Osbaldo Landa M.D. or listen to The [employer] Podcast Episode 63: Understanding \"Nutritarian\" Eating w/Dr. Osbaldo Landa  The Game Changers- Netflix Documentary on plant based nutrition  The Dr. Vee T5 Wellness Plan by Dr. Reji Vee MD  The Complete Guide to fasting by Dr. Munoz  @[employer] (Instagram Dietician with support surrounding nutrition and meal prep/planning)     Education, Motivation and Support Resources  Live to 100: Secrets of the Blue Zones - Netflix series on the secrets to communities living over 100 years old  Atomic Habits by Ryan Chris (a book about taking small steps to promote greater behavior change)   Motivation shadi (black box with white \")- daily supportive messages sent to your phone  Can't Hurt Me by Shaun Dominguez (a book exploring the power of discipline in achieving your goals)  Fed Up - documentary about obesity (Free on Utube)  Www.yourweightmatters.org - Obesity Action Coalition sponsored Blog posts  Obesity Action Coalition Resources on topics specific to weight management (www.obesityaction.org)  Fitlosophy Fitspiration - journal to better health (journal book found at [employer] in fitness aisle)  Bryant Marin talk titled: The Call to Courage (Netflix)  The Exam Room by the Physician's Committee (Podcast)  Nutrition Facts by Dr. Delgadillo (Podcast)     Balanced Nutrition includes:      Build the mentality of Food 4 Fuel. Clean eating with whole foods and eliminating/reducing ultra processed foods.  Be an intuitive eater and using mindful eating practices.  Eat a balanced plate with protein and produce at all meals: 1/4  plate- protein, 1/2 plate non starchy veggies, and 1/4 plate fruit or complex carbohydrate.  Drink water with all meals and use a salad plate to naturally reduce portions.  Eliminate/reduce late night eating by stopping after 7pm. Allowing your body to fast for 12 hours (drink only water, tea or black coffee without any additives).                What’s for Lunch? Planning and Prepping the Basics  March 4, 2022  Posted in Blog, Nutrition  By Your Weight Matters Campaign     Whether you work from home or in an office, lunch can be a challenge. Finding time mid-day for a nutrition-packed lunch isn’t easy. Remember, lunch is a time to refuel for the second part of the day. Packing food with a lot of nutrition can make your afternoon more successful! Instead of being pulled to the nearest fast food restaurant, look for new options to add to your day.     Lunch Basics  First, start with these basic tips to eat more power-packed lunches.     Make Your Lunch Balanced  Your lunch should include protein, fruit, vegetables, dairy and whole grains. Adding all the food groups can give you maximum nutrition. Ham and cheese on a whole wheat tortilla with yogurt, apple slices and carrot sticks can be a great meal. It provides protein, complex carbohydrates and fiber. You could also consider a peanut butter and banana sandwich on whole wheat bread with green peppers and low-fat dip, string cheese and strawberries.     Plan Ahead  There is nothing worse than not having a plan for lunch and resorting to a greasy burger with fries. Spend some prep time to get settled for the week by chopping and bagging vegetables, slicing fruit in containers, and portioning out whole grain crackers. If you prep on Sunday, you can grab and go all week. You could also consider packing the entire lunch the night before.     Cook a Little Extra  Having grilled chicken on Sunday night? Mamers a couple extra chicken breasts to chop up for a salad or put  inside a whole grain tortilla. Think of this often. Last night’s dinner can be tomorrow’s lunch! An extra serving of chili or leftover pasta are other great options.     Ideas to Get Started with the Main Dish     Find a thermos and have different sizes of containers on hand. This is a great way to use your leftovers!     Chicken, pork or steak with a side of barbecue sauce  Soups and chili  Last night’s dinner -stir hennessy or casserole (higher caloric density, choose a smaller portion like 1 cup)     Zimmerman or Wrap: Use low carb or skip the bread all together and use a daniel lettuce leaf as your base  Whole grain bread with lunch meat  Whole grain tortilla with peanut butter and banana (or any fruit--try strawberry!)  Cheese quesadilla     Salads to Go:  Lettuce, veggies and protein (use last night’s protein)  Cold pasta salad with grilled chicken  Tuna or chicken salad     Add Sensible Sides:  Edamame  Baked chips  Peppers and hummus  Low-fat yogurt  Blueberries  Whole grain crackers  Pasta salad  Dried fruit  Berries and fruit dip  Cheese cubes  Veggies and dip  Avocado slices  Cottage cheese  Last night’s vegetables     What about Lunch Out?  On a busy day, takeout can be an option. You can still make your health a priority while grabbing takeout or eating out. Check out the menu for nutrition-packed proteins and sides. Choose salads and grilled meat and opt for smaller portions. Many restaurants are adding more and more healthy options as time goes on, so making healthy choices keeps getting easier.     Take it one step at a time on your way to a healthy lunch! Every bite counts.     For practical tips on meal prep, planning, shopping and dining please watch the Utube video presented at the [id number] conference by Obesity Action Coalition with the following link:      https://www.obesityaction.org/oac-videos/planning-shopping-and-dining-practical-tactics-for-good-nutrition/#:~:text=Planning%2C%20Shopping%20and%20Dining%3A%20Practical%20Tactics%20for%20Good%20Nutrition

## 2024-11-04 NOTE — PROGRESS NOTES
The 21st Century Cures Act makes medical notes like these available to patients in the interest of transparency. Please be advised this is a medical document. Medical documents are intended to carry relevant information, facts as evident, and the clinical opinion of the practitioner. The medical note is intended as peer to peer communication and may appear blunt or direct. It is written in medical language and may contain abbreviations or verbiage that are unfamiliar.       Wyatt Tracey is a 34 year old male.    HPI:     Chief Complaint   Patient presents with    Follow - Up     This 34-year-old male presents to the office with several concerns today.    1.  The patient has had recurrence of folliculitis at the occipital hairline.  He had this back in July 2024 which resolved with Bactrim.  He would like a refill on the Bactrim.  He is not having any fever, chills or purulent drainage.    2.  The patient would be interested in a referral to the weight loss clinic.  The patient is asking about what medications are available for weight loss.  He has no family history for thyroid or pancreatic cancer or MEN.  He has no history for thyroid cancer.  The patient states he has been trying to increase his activity.    3.  The patient states his work shift has changed a little.  He is now working 2:30 PM-1 AM.  He states he gets home about 1:30 am and he tries to fall asleep by 2:00 am.  He will wake up about 5 am and then be up for about an hour and go back to sleep for another 2 hours and then he does not feel rested.  The patient has been told he may have sleep apnea.  He states an ex-girlfriend has commented on the fact that he snores and stops breathing while he is sleeping.  He has never had a sleep test.  He has never used a CPAP machine.      HISTORY:  Past Medical History:    Hypertriglyceridemia    Obesity    Sleep apnea      No past surgical history on file.   Family History   Problem Relation Age  of Onset    No Known Problems Mother     Other (Other) Father         trigeminal neuralgia      Social History:   Social History     Socioeconomic History    Marital status: Single   Tobacco Use    Smoking status: Never    Smokeless tobacco: Never   Vaping Use    Vaping status: Never Used   Substance and Sexual Activity    Alcohol use: Not Currently    Drug use: Not Currently    Sexual activity: Yes   Other Topics Concern    Caffeine Concern No    Exercise No    Seat Belt No    Special Diet No    Stress Concern Yes    Weight Concern Yes        Medications (Active prior to today's visit):  Current Outpatient Medications   Medication Sig Dispense Refill    sulfamethoxazole-trimethoprim -160 MG Oral Tab per tablet Take 1 tablet by mouth 2 (two) times daily. 14 tablet 0    SUMAtriptan Succinate (IMITREX) 100 MG Oral Tab Take at onset of migraine. May repeat one tab in 2 hours if not better. No more than 2 tabs per 24 hours. Do not use more than 2x/week. 9 tablet 1       Allergies:  Allergies[1]    ROS:     Pertinent positives and pertinent negatives are as listed in HPI.    All other review of symptoms were reviewed and negative.    PHYSICAL EXAM:   BP 98/58 (BP Location: Left arm, Patient Position: Sitting, Cuff Size: large)   Pulse 70   Temp 97.5 °F (36.4 °C)   Resp 18   Ht 5' 8\" (1.727 m)   Wt 279 lb (126.6 kg)   SpO2 99%   BMI 42.42 kg/m²     Wt Readings from Last 3 Encounters:   11/04/24 279 lb (126.6 kg)   10/12/24 280 lb (127 kg)   07/29/24 277 lb (125.6 kg)       BP Readings from Last 3 Encounters:   11/04/24 98/58   10/12/24 104/70   07/29/24 120/80     Weight is up 2 # from 7/29/2024 OV    General: Morbidly obese, well hydrated. No acute distress. No pallor.   HEENT: Normocephalic, atraumatic.  RUMA, EOMI, Sclera clear and non icteric bilaterally. TM's normal, nose without congestion, pharynx without redness or exudates. Moist mucous membranes.  Neck: Supple. No lymphadenopathy. No thyromegaly.  No bruits noted.  Heart: RRR without S3 or S4 or murmur.  Lungs: Clear to auscultation bilaterally. No rales, rhonchi or wheezes. No tachypnea or retractions noted.  Abdomen: Soft, obese,nontender, nondistended, normal bowel sounds. No organomegaly. No guarding, rigidity or rebound noted.  Extremities: No edema bilaterally.  Skin: Warm and dry. Folliculitis is noted in the occipital hair line. No signs of cellulitis or purulent drainage is noted.  Neuro: Alert and oriented x 3.normal gait.  Psych: Normal mood and affect.     ASSESSMENT/PLAN:   34 year old male with    LABS This Visit:    Results for orders placed or performed in visit on 03/04/24   Comp Metabolic Panel (14) [E]    Collection Time: 03/04/24 11:29 AM   Result Value Ref Range    Glucose 99 70 - 99 mg/dL    Sodium 139 136 - 145 mmol/L    Potassium 4.3 3.5 - 5.1 mmol/L    Chloride 111 98 - 112 mmol/L    CO2 27.0 21.0 - 32.0 mmol/L    Anion Gap 1 0 - 18 mmol/L    BUN 10 9 - 23 mg/dL    Creatinine 0.83 0.70 - 1.30 mg/dL    Calcium, Total 9.3 8.5 - 10.1 mg/dL    Calculated Osmolality 287 275 - 295 mOsm/kg    eGFR-Cr 120 >=60 mL/min/1.73m2    AST 21 15 - 37 U/L    ALT 50 16 - 61 U/L    Alkaline Phosphatase 55 45 - 117 U/L    Bilirubin, Total 1.0 0.1 - 2.0 mg/dL    Total Protein 7.7 6.4 - 8.2 g/dL    Albumin 3.7 3.4 - 5.0 g/dL    Globulin  4.0 2.8 - 4.4 g/dL    A/G Ratio 0.9 (L) 1.0 - 2.0    Patient Fasting for CMP? Yes    Lipid Panel    Collection Time: 03/04/24 11:29 AM   Result Value Ref Range    Cholesterol, Total 157 <200 mg/dL    HDL Cholesterol 38 (L) 40 - 59 mg/dL    Triglycerides 140 30 - 149 mg/dL    LDL Cholesterol 94 <100 mg/dL    VLDL 23 0 - 30 mg/dL    Non HDL Chol 119 <130 mg/dL    Patient Fasting for Lipid? Yes    TSH W Reflex To Free T4    Collection Time: 03/04/24 11:29 AM   Result Value Ref Range    TSH 1.770 0.358 - 3.740 mIU/mL   Hemoglobin A1C [E]    Collection Time: 03/04/24 11:29 AM   Result Value Ref Range    HgbA1C 5.7 (H) <5.7 %     Estimated Average Glucose 117 68 - 126 mg/dL   CBC W/ DIFFERENTIAL    Collection Time: 03/04/24 11:29 AM   Result Value Ref Range    WBC 6.0 4.0 - 11.0 x10(3) uL    RBC 4.93 4.30 - 5.70 x10(6)uL    HGB 16.1 13.0 - 17.5 g/dL    HCT 45.8 39.0 - 53.0 %    .0 150.0 - 450.0 10(3)uL    MCV 92.9 80.0 - 100.0 fL    MCH 32.7 26.0 - 34.0 pg    MCHC 35.2 31.0 - 37.0 g/dL    RDW 12.0 %    Neutrophil Absolute Prelim 3.12 1.50 - 7.70 x10 (3) uL    Neutrophil Absolute 3.12 1.50 - 7.70 x10(3) uL    Lymphocyte Absolute 2.30 1.00 - 4.00 x10(3) uL    Monocyte Absolute 0.44 0.10 - 1.00 x10(3) uL    Eosinophil Absolute 0.05 0.00 - 0.70 x10(3) uL    Basophil Absolute 0.05 0.00 - 0.20 x10(3) uL    Immature Granulocyte Absolute 0.06 0.00 - 1.00 x10(3) uL    Neutrophil % 51.9 %    Lymphocyte % 38.2 %    Monocyte % 7.3 %    Eosinophil % 0.8 %    Basophil % 0.8 %    Immature Granulocyte % 1.0 %        1. Folliculitis    Hair care is discussed.  I have refilled his Bactrim DS 1 twice daily for 7 days.  Usage and side effects are reviewed.    - sulfamethoxazole-trimethoprim -160 MG Oral Tab per tablet; Take 1 tablet by mouth 2 (two) times daily.  Dispense: 14 tablet; Refill: 0    2. Morbid obesity with body mass index (BMI) of 40.0 to 44.9 in adult (HCC)    The patient is referred to the weight loss clinic.  I discussed the numerous medications which can be used for weight loss.  The patient was advised to check with his insurance company to see if they cover any medications for weight loss and to let me know.  Handout with information for weight loss was given to the patient as well.    - OP REFERRAL TO WEIGHT LOSS CLINIC    3. YAMILKA (obstructive sleep apnea)    I discussed sleep apnea with the patient.  The patient has never had a formal sleep study.  I told him once he has completed the sleep study, he would be seen by , the sleep specialist.  Sleep hygiene was reviewed with the patient.    - Diagnostic Sleep  Study-split night PAP implemented if criteria met  - General sleep study; Future         Health Maintenance:    Health Maintenance   Topic Date Due    Influenza Vaccine (1) 06/30/2025 (Originally 10/1/2024)    COVID-19 Vaccine (3 - 2023-24 season) 10/12/2025 (Originally 9/1/2024)    Annual Physical  03/04/2025    DTaP,Tdap,and Td Vaccines (8 - Td or Tdap) 03/04/2034    Annual Depression Screening  Completed    Pneumococcal Vaccine: Birth to 64yrs  Aged Out         Meds This Visit:  Requested Prescriptions     Signed Prescriptions Disp Refills    sulfamethoxazole-trimethoprim -160 MG Oral Tab per tablet 14 tablet 0     Sig: Take 1 tablet by mouth 2 (two) times daily.       Imaging & Referrals:  OP REFERRAL TO DIAGNOSTIC SLEEP STUDY  OP REFERRAL TO WEIGHT LOSS CLINIC     Patient understands plan and follow-up.  Follow up pending sleep study results.    11/4/2024  Maribel Padilla DO    Total time: 30 minutes including precharting, H&P, plan of care    This dictation was performed with a verbal recognition program (DRAGON) and it was checked for errors. It is possible that there are still dictated errors within this office note. If so, please bring any errors to my attention for an addendum. All efforts were made to ensure that this office note is accurate         [1] No Known Allergies

## 2025-01-08 ENCOUNTER — TELEPHONE (OUTPATIENT)
Dept: FAMILY MEDICINE CLINIC | Facility: CLINIC | Age: 35
End: 2025-01-08

## 2025-01-08 NOTE — TELEPHONE ENCOUNTER
Spoke to patient regarding right ear pain/ringing. This has been present since Friday. He did have some congestion and runny nose the week before but it has subsided. He feels pressure in his right ear with some ringing, he describes as \"white noise\". Patient states he is going to try over the counter solution and then go to immediate care/walk in if this continues.

## 2025-01-08 NOTE — TELEPHONE ENCOUNTER
Wyatt Tracey  LOV: 11/4/2024       Calling for new condition (choose one)  Patient experiencing: ear pain/ringing in ear (list symptoms/concerns)  Duration/Onset of symptom: did not state  Appointment Offered: 1/27/2025

## 2025-03-10 ENCOUNTER — OFFICE VISIT (OUTPATIENT)
Dept: FAMILY MEDICINE CLINIC | Facility: CLINIC | Age: 35
End: 2025-03-10
Payer: COMMERCIAL

## 2025-03-10 VITALS
SYSTOLIC BLOOD PRESSURE: 104 MMHG | BODY MASS INDEX: 38.49 KG/M2 | HEIGHT: 68 IN | OXYGEN SATURATION: 99 % | DIASTOLIC BLOOD PRESSURE: 52 MMHG | WEIGHT: 254 LBS | RESPIRATION RATE: 18 BRPM | TEMPERATURE: 98 F | HEART RATE: 60 BPM

## 2025-03-10 DIAGNOSIS — G43.009 MIGRAINE WITHOUT AURA AND WITHOUT STATUS MIGRAINOSUS, NOT INTRACTABLE: ICD-10-CM

## 2025-03-10 DIAGNOSIS — E66.01 CLASS 2 SEVERE OBESITY DUE TO EXCESS CALORIES WITH SERIOUS COMORBIDITY AND BODY MASS INDEX (BMI) OF 38.0 TO 38.9 IN ADULT (HCC): ICD-10-CM

## 2025-03-10 DIAGNOSIS — Z00.00 LABORATORY EXAM ORDERED AS PART OF ROUTINE GENERAL MEDICAL EXAMINATION: ICD-10-CM

## 2025-03-10 DIAGNOSIS — E78.1 HYPERTRIGLYCERIDEMIA: ICD-10-CM

## 2025-03-10 DIAGNOSIS — E66.812 CLASS 2 SEVERE OBESITY DUE TO EXCESS CALORIES WITH SERIOUS COMORBIDITY AND BODY MASS INDEX (BMI) OF 38.0 TO 38.9 IN ADULT (HCC): ICD-10-CM

## 2025-03-10 DIAGNOSIS — R73.03 PREDIABETES: ICD-10-CM

## 2025-03-10 DIAGNOSIS — G47.33 OSA (OBSTRUCTIVE SLEEP APNEA): ICD-10-CM

## 2025-03-10 DIAGNOSIS — R79.89 ELEVATED LFTS: ICD-10-CM

## 2025-03-10 DIAGNOSIS — Z00.00 WELLNESS EXAMINATION: Primary | ICD-10-CM

## 2025-03-10 DIAGNOSIS — Z23 NEED FOR INFLUENZA VACCINATION: ICD-10-CM

## 2025-03-10 NOTE — PATIENT INSTRUCTIONS
You received the flu vaccine today. You may run a low grade fever, have mild redness or swelling at the site of the shot, muscle pain at the site of the shot for the next 2-3 days. You may take Tylenol or Ibuprofen as needed. Use your arm to help decrease pain and swelling. You can apply ice to any swelling for 10-15 minutes twice daily through clothing or a towel.    Restart the CPAP is you have increased daytime sleepiness or your sleep worsens again.    Continue to monitor your diet and continue with your exercise program.    For premenopausal women and men, 1000 mg of calcium daily is recommended. For postmenopausal women, 1200 mg of calcium daily is recommended.    To help the body absorb and use calcium, vitamin D 2000 international units daily is recommended.    I will contact you with your test results once available.

## 2025-03-10 NOTE — PROGRESS NOTES
The 21st Century Cures Act makes medical notes like these available to patients in the interest of transparency. Please be advised this is a medical document. Medical documents are intended to carry relevant information, facts as evident, and the clinical opinion of the practitioner. The medical note is intended as peer to peer communication and may appear blunt or direct. It is written in medical language and may contain abbreviations or verbiage that are unfamiliar.     Wyatt Tracey is a 34 year old male who is here for   Chief Complaint   Patient presents with    Well Adult       HPI:     This 34-year-old male presents to the office for his annual wellness exam and follow-up on his multiple medical problems.    The patient has history for prediabetes, hypertriglyceridemia and elevated liver function tests.  The patient states he has adjusted his diet and he is trying to avoid junk food and sugary things including soda.  He has lost weight.  He is now exercising 5 days a week.  He does not drink alcohol.  He has no past history for hepatitis or jaundice.  He denies any abdominal pain, nausea, vomiting, diarrhea, melena or hematochezia.  He is not having any chest pain, palpitations, shortness of breath, dizziness, syncope or leg swelling.    The patient states his migraines have improved significantly and he has not needed to use any Imitrex for months.    He is no longer using his CPAP machine.  He states since he has lost weight, he is sleeping much better and he no longer has daytime sleepiness.    He would like to get his flu shot today.    He has no other concerns today and states, in general, he is feeling well.    Exercise:  lifting weights and cardio 5 days a week .  Diet:  avoids soda and junk food and watches somewhat  Sleep: 5 hours     Depression/Anxiety:  PHQ-2 SCORE: 0  , done 3/10/2025   If you checked off any problems, how difficult have these problems made it for you to do your  work, take care of things at home, or get along with other people?: Not difficult at all    Last [place] Suicide Screening on 3/10/2025 was No Risk.       Fall Risk: No falls last 3 months  Gun in home:No             Glasses/contacts:No             Recent eye doctor visit:No   Hearing aids:No    Family History for:  Testicular CA - No   Prostate CA - No                               Colon CA - No    Colonoscopy: Never      Past Medical History:    Hypertriglyceridemia    Obesity    Prediabetes    Sleep apnea       History reviewed. No pertinent surgical history.    Family History   Problem Relation Age of Onset    No Known Problems Mother     Other (Other) Father         trigeminal neuralgia       Social History     Socioeconomic History    Marital status: Single   Tobacco Use    Smoking status: Never    Smokeless tobacco: Never   Vaping Use    Vaping status: Never Used   Substance and Sexual Activity    Alcohol use: Not Currently    Drug use: Not Currently    Sexual activity: Yes   Other Topics Concern    Caffeine Concern No    Exercise No    Seat Belt No    Special Diet No    Stress Concern Yes    Weight Concern Yes     Works as a QA assistant.    Medications Ordered Prior to Encounter[1]    Allergies[2]    REVIEW OF SYSTEMS:     ROS is negative except as stated in HPI.    EXAM:   /52 (BP Location: Left arm, Patient Position: Sitting, Cuff Size: large)   Pulse 60   Temp 97.7 °F (36.5 °C)   Resp 18   Ht 5' 8\" (1.727 m)   Wt 254 lb (115.2 kg)   SpO2 99%   BMI 38.62 kg/m²     Wt Readings from Last 6 Encounters:   03/10/25 254 lb (115.2 kg)   11/04/24 279 lb (126.6 kg)   10/12/24 280 lb (127 kg)   07/29/24 277 lb (125.6 kg)   03/04/24 268 lb (121.6 kg)   01/17/23 257 lb (116.6 kg)       BP Readings from Last 3 Encounters:   03/10/25 104/52   11/04/24 98/58   10/12/24 104/70        Visual Acuity  Right Eye Visual Acuity: Uncorrected Right Eye Chart Acuity: 20/20   Left Eye Visual Acuity: Uncorrected Left  Eye Chart Acuity: 20/20   Both Eyes Visual Acuity: Uncorrected Both Eyes Chart Acuity: 20/20   Able To Tolerate Visual Acuity: Yes      Weight is down 26 # from 10/12/2024.    General: WH/Obese/WD, in NAD, A and O times 3  HEAD: Normocephalic, atraumatic  EYES: RUMA, EOMI, conjunctiva normal, sclera anicteric  EARS: Tympanic membranes normal, EAC's normal.  NOSE: Turbninates normal, no bleeding noted.  PHARYNX:  No eythema or exudates, mucous membrane moist, airway patent.  NECK:  No cervical lymphadenopathy or thyromegaly, normal ROM, no bruits noted.  HEART: Regular rate and rhythm, no S3, S4 or murmur noted.  LUNGS: Clear to ausculation. No retractions or tachypnea noted.  ABDOMEN: Soft, obese, nontender, no guarding, rigidity or rebound, no masses or hepatosplenomegaly, normal bowel sounds in all four quadrants.  : deferred  BACK: No tenderness over the thoracic or lumbar spine. No scoliosis noted.  EXTREMITIES: No clubbing, cyanosis, edema noted. Motor strength +5/5 in all 4 extremities. DTR's +2/4 in all 4 extremities.  SKIN: Warm and dry.  NEURO: Cr. N. II-XII intact, normal gait  PSYCH: Mood and affect are normal.    ASSESSMENT AND PLAN:     1. Wellness examination  -We discussed the following:  Healthy diet and exercise, immunizations, cancer screening.    2. Laboratory exam ordered as part of routine general medical examination    - CBC With Differential With Platelet; Future  - Comp Metabolic Panel (14); Future  - Lipid Panel; Future  - TSH W Reflex To Free T4; Future    3. Prediabetes    Lab Results   Component Value Date    A1C 5.7 (H) 03/04/2024      Patient due for recheck on A1C    - Hemoglobin A1C [E]; Future    4. Hypertriglyceridemia    Cholesterol:     Lab Results   Component Value Date    CHOLEST 157 03/04/2024    CHOLEST 144 01/25/2023     Lab Results   Component Value Date    HDL 38 (L) 03/04/2024    HDL 38 (L) 01/25/2023     Lab Results   Component Value Date    TRIG 140 03/04/2024    TRIG  202 (H) 01/25/2023     Lab Results   Component Value Date    LDL 94 03/04/2024    LDL 72 01/25/2023     Lab Results   Component Value Date    AST 21 03/04/2024    AST 55 (H) 01/25/2023     Lab Results   Component Value Date    ALT 50 03/04/2024    ALT 63 (H) 01/25/2023     Patient due for recheck on lipid panel.    5. Elevated LFTs    Patient due for recheck on LFT's    6. YAMILKA (obstructive sleep apnea)    Symptoms have improved with weight loss. Not currently using the CPAP. I reminded the patient if he has increasing daytime sleepiness or worsening of his sleep, he should restart the CPAP.    7. Migraine without aura and without status migrainosus, not intractable    Headaches have improved. Has not needed to use the imitrex in months.    8. Class 2 severe obesity due to excess calories with serious comorbidity and body mass index (BMI) of 38.0 to 38.9 in adult (HCC)    Patient is actively exercising 5 days a weight and monitoring his diet. He has last 26 # from 10/24.    9. Need for influenza vaccination    Patient received flu shot today. Side effects discussed.    - Fluzone trivalent vaccine, PF 0.5mL, 6mo+ (46662)         Health maintenance:    Health Maintenance   Topic Date Due    Annual Depression Screening  Completed    Influenza Vaccine (1) Completed    COVID-19 Vaccine (3 - 2024-25 season) 10/12/2025 (Originally 9/1/2024)    Annual Physical  03/10/2026    DTaP,Tdap,and Td Vaccines (8 - Td or Tdap) 03/04/2034    Pneumococcal Vaccine: Birth to 50yrs  Aged Out    Meningococcal B Vaccine  Aged Out         Orders This Visit:  Orders Placed This Encounter   Procedures    CBC With Differential With Platelet    Comp Metabolic Panel (14)    Lipid Panel    TSH W Reflex To Free T4    Hemoglobin A1C [E]    Fluzone trivalent vaccine, PF 0.5mL, 6mo+ (18846)       Meds This Visit:  Requested Prescriptions      No prescriptions requested or ordered in this encounter       Imaging & Referrals:  INFLUENZA VACCINE, TRI,  PRESERV FREE, 0.5 ML     The patient indicates understanding of these issues and agrees to the plan.  The patient is asked to return pending lab results.  Maribel Padilla, DO    I spent a total of 40 minutes including precharting, H&P, plan of care    This dictation was performed with a verbal recognition program (DRAGON) and it was checked for errors. It is possible that there are still dictated errors within this office note. If so, please bring any errors to my attention for an addendum. All efforts were made to ensure that this office note is accurate         [1]   Current Outpatient Medications on File Prior to Visit   Medication Sig Dispense Refill    SUMAtriptan Succinate (IMITREX) 100 MG Oral Tab Take at onset of migraine. May repeat one tab in 2 hours if not better. No more than 2 tabs per 24 hours. Do not use more than 2x/week. 9 tablet 1     No current facility-administered medications on file prior to visit.   [2] No Known Allergies

## 2025-03-20 ENCOUNTER — LAB ENCOUNTER (OUTPATIENT)
Dept: LAB | Age: 35
End: 2025-03-20
Attending: FAMILY MEDICINE
Payer: COMMERCIAL

## 2025-03-20 DIAGNOSIS — R73.03 PREDIABETES: ICD-10-CM

## 2025-03-20 DIAGNOSIS — Z00.00 LABORATORY EXAM ORDERED AS PART OF ROUTINE GENERAL MEDICAL EXAMINATION: ICD-10-CM

## 2025-03-20 DIAGNOSIS — R17 ELEVATED BILIRUBIN: ICD-10-CM

## 2025-03-20 LAB
ALBUMIN SERPL-MCNC: 4.8 G/DL (ref 3.2–4.8)
ALBUMIN/GLOB SERPL: 1.7 {RATIO} (ref 1–2)
ALP LIVER SERPL-CCNC: 46 U/L
ALT SERPL-CCNC: 51 U/L
ANION GAP SERPL CALC-SCNC: 6 MMOL/L (ref 0–18)
AST SERPL-CCNC: 31 U/L (ref ?–34)
BASOPHILS # BLD AUTO: 0.03 X10(3) UL (ref 0–0.2)
BASOPHILS NFR BLD AUTO: 0.5 %
BILIRUB SERPL-MCNC: 1.7 MG/DL (ref 0.3–1.2)
BUN BLD-MCNC: 10 MG/DL (ref 9–23)
CALCIUM BLD-MCNC: 9.8 MG/DL (ref 8.7–10.6)
CHLORIDE SERPL-SCNC: 101 MMOL/L (ref 98–112)
CHOLEST SERPL-MCNC: 139 MG/DL (ref ?–200)
CO2 SERPL-SCNC: 32 MMOL/L (ref 21–32)
CREAT BLD-MCNC: 0.98 MG/DL
EGFRCR SERPLBLD CKD-EPI 2021: 105 ML/MIN/1.73M2 (ref 60–?)
EOSINOPHIL # BLD AUTO: 0.05 X10(3) UL (ref 0–0.7)
EOSINOPHIL NFR BLD AUTO: 0.8 %
ERYTHROCYTE [DISTWIDTH] IN BLOOD BY AUTOMATED COUNT: 12.4 %
EST. AVERAGE GLUCOSE BLD GHB EST-MCNC: 94 MG/DL (ref 68–126)
FASTING PATIENT LIPID ANSWER: YES
FASTING STATUS PATIENT QL REPORTED: YES
GLOBULIN PLAS-MCNC: 2.9 G/DL (ref 2–3.5)
GLUCOSE BLD-MCNC: 79 MG/DL (ref 70–99)
HBA1C MFR BLD: 4.9 % (ref ?–5.7)
HCT VFR BLD AUTO: 45.4 %
HDLC SERPL-MCNC: 34 MG/DL (ref 40–59)
HGB BLD-MCNC: 15.8 G/DL
IMM GRANULOCYTES # BLD AUTO: 0.03 X10(3) UL (ref 0–1)
IMM GRANULOCYTES NFR BLD: 0.5 %
LDLC SERPL CALC-MCNC: 81 MG/DL (ref ?–100)
LYMPHOCYTES # BLD AUTO: 2.19 X10(3) UL (ref 1–4)
LYMPHOCYTES NFR BLD AUTO: 34.2 %
MCH RBC QN AUTO: 32.9 PG (ref 26–34)
MCHC RBC AUTO-ENTMCNC: 34.8 G/DL (ref 31–37)
MCV RBC AUTO: 94.6 FL
MONOCYTES # BLD AUTO: 0.58 X10(3) UL (ref 0.1–1)
MONOCYTES NFR BLD AUTO: 9 %
NEUTROPHILS # BLD AUTO: 3.53 X10 (3) UL (ref 1.5–7.7)
NEUTROPHILS # BLD AUTO: 3.53 X10(3) UL (ref 1.5–7.7)
NEUTROPHILS NFR BLD AUTO: 55 %
NONHDLC SERPL-MCNC: 105 MG/DL (ref ?–130)
OSMOLALITY SERPL CALC.SUM OF ELEC: 286 MOSM/KG (ref 275–295)
PLATELET # BLD AUTO: 210 10(3)UL (ref 150–450)
POTASSIUM SERPL-SCNC: 4.2 MMOL/L (ref 3.5–5.1)
PROT SERPL-MCNC: 7.7 G/DL (ref 5.7–8.2)
RBC # BLD AUTO: 4.8 X10(6)UL
SODIUM SERPL-SCNC: 139 MMOL/L (ref 136–145)
TRIGL SERPL-MCNC: 134 MG/DL (ref 30–149)
TSI SER-ACNC: 2.24 UIU/ML (ref 0.55–4.78)
VLDLC SERPL CALC-MCNC: 21 MG/DL (ref 0–30)
WBC # BLD AUTO: 6.4 X10(3) UL (ref 4–11)

## 2025-03-20 PROCEDURE — 80053 COMPREHEN METABOLIC PANEL: CPT

## 2025-03-20 PROCEDURE — 84443 ASSAY THYROID STIM HORMONE: CPT

## 2025-03-20 PROCEDURE — 36415 COLL VENOUS BLD VENIPUNCTURE: CPT

## 2025-03-20 PROCEDURE — 83036 HEMOGLOBIN GLYCOSYLATED A1C: CPT

## 2025-03-20 PROCEDURE — 85025 COMPLETE CBC W/AUTO DIFF WBC: CPT

## 2025-03-20 PROCEDURE — 82248 BILIRUBIN DIRECT: CPT

## 2025-03-20 PROCEDURE — 80061 LIPID PANEL: CPT

## 2025-03-21 DIAGNOSIS — R17 ELEVATED BILIRUBIN: Primary | ICD-10-CM

## 2025-03-21 PROBLEM — R73.03 PREDIABETES: Status: RESOLVED | Noted: 2024-03-05 | Resolved: 2025-03-21

## 2025-03-21 LAB — BILIRUB DIRECT SERPL-MCNC: 0.6 MG/DL (ref ?–0.3)
